# Patient Record
Sex: FEMALE | Race: WHITE | NOT HISPANIC OR LATINO | Employment: OTHER | URBAN - METROPOLITAN AREA
[De-identification: names, ages, dates, MRNs, and addresses within clinical notes are randomized per-mention and may not be internally consistent; named-entity substitution may affect disease eponyms.]

---

## 2017-10-27 ENCOUNTER — ESTABLISHED COMPREHENSIVE EXAM (OUTPATIENT)
Dept: URBAN - METROPOLITAN AREA CLINIC 43 | Facility: CLINIC | Age: 72
End: 2017-10-27

## 2017-10-27 DIAGNOSIS — H43.813: ICD-10-CM

## 2017-10-27 DIAGNOSIS — Z96.1: ICD-10-CM

## 2017-10-27 DIAGNOSIS — H04.123: ICD-10-CM

## 2017-10-27 PROCEDURE — 1036F TOBACCO NON-USER: CPT

## 2017-10-27 PROCEDURE — 92015 DETERMINE REFRACTIVE STATE: CPT

## 2017-10-27 PROCEDURE — 92014 COMPRE OPH EXAM EST PT 1/>: CPT

## 2017-10-27 PROCEDURE — G8427 DOCREV CUR MEDS BY ELIG CLIN: HCPCS

## 2017-10-27 PROCEDURE — G8785 BP SCRN NO PERF AT INTERVAL: HCPCS

## 2017-10-27 ASSESSMENT — TONOMETRY
OS_IOP_MMHG: 8
OD_IOP_MMHG: 8

## 2017-10-27 ASSESSMENT — VISUAL ACUITY
OU_SC: 20/30-2
OS_SC: 20/30+2
OU_SC: J1
OS_SC: J1
OD_SC: J4
OD_SC: 20/50-1

## 2017-11-27 ENCOUNTER — SURGERY/PROCEDURE (OUTPATIENT)
Dept: URBAN - METROPOLITAN AREA CLINIC 39 | Facility: CLINIC | Age: 72
End: 2017-11-27

## 2017-11-27 ENCOUNTER — ESTABLISHED PATIENT (OUTPATIENT)
Dept: URBAN - METROPOLITAN AREA CLINIC 39 | Facility: CLINIC | Age: 72
End: 2017-11-27

## 2017-11-27 VITALS
DIASTOLIC BLOOD PRESSURE: 62 MMHG | RESPIRATION RATE: 17 BRPM | SYSTOLIC BLOOD PRESSURE: 135 MMHG | HEART RATE: 78 BPM | HEIGHT: 55 IN

## 2017-11-27 DIAGNOSIS — H26.491: ICD-10-CM

## 2017-11-27 DIAGNOSIS — H26.492: ICD-10-CM

## 2017-11-27 PROCEDURE — 92014 COMPRE OPH EXAM EST PT 1/>: CPT

## 2017-11-27 PROCEDURE — 1036F TOBACCO NON-USER: CPT

## 2017-11-27 PROCEDURE — G8952 PRE-HTN/HTN, NO F/U, NOT GVN: HCPCS

## 2017-11-27 PROCEDURE — 66821 AFTER CATARACT LASER SURGERY: CPT

## 2017-11-27 PROCEDURE — G8427 DOCREV CUR MEDS BY ELIG CLIN: HCPCS

## 2017-11-27 ASSESSMENT — VISUAL ACUITY
OD_SC: J4
OS_SC: J1
OD_BAT: 20/40
OS_BAT: 20/40
OS_SC: 20/25+2
OD_SC: 20/25

## 2017-11-27 ASSESSMENT — TONOMETRY
OD_IOP_MMHG: 11
OS_IOP_MMHG: 11

## 2017-12-04 ENCOUNTER — ESTABLISHED PATIENT (OUTPATIENT)
Dept: URBAN - METROPOLITAN AREA CLINIC 39 | Facility: CLINIC | Age: 72
End: 2017-12-04

## 2017-12-04 ENCOUNTER — SURGERY/PROCEDURE (OUTPATIENT)
Dept: URBAN - METROPOLITAN AREA SURGERY 14 | Facility: SURGERY | Age: 72
End: 2017-12-04

## 2017-12-04 DIAGNOSIS — H26.492: ICD-10-CM

## 2017-12-04 PROCEDURE — G8428 CUR MEDS NOT DOCUMENT: HCPCS

## 2017-12-04 PROCEDURE — G8482 FLU IMMUNIZE ORDER/ADMIN: HCPCS

## 2017-12-04 PROCEDURE — 1036F TOBACCO NON-USER: CPT

## 2017-12-04 PROCEDURE — G8785 BP SCRN NO PERF AT INTERVAL: HCPCS

## 2017-12-04 PROCEDURE — 66821 AFTER CATARACT LASER SURGERY: CPT

## 2017-12-04 PROCEDURE — 4040F PNEUMOC VAC/ADMIN/RCVD: CPT

## 2017-12-04 PROCEDURE — 99213 OFFICE O/P EST LOW 20 MIN: CPT

## 2017-12-04 ASSESSMENT — VISUAL ACUITY
OS_SC: J1
OD_SC: J1
OD_SC: 20/20
OS_SC: 20/20
OS_BAT: 20/40

## 2017-12-04 ASSESSMENT — TONOMETRY
OD_IOP_MMHG: 12
OS_IOP_MMHG: 12

## 2017-12-05 ENCOUNTER — POST-OP (OUTPATIENT)
Dept: URBAN - METROPOLITAN AREA CLINIC 43 | Facility: CLINIC | Age: 72
End: 2017-12-05

## 2017-12-05 DIAGNOSIS — Z98.890: ICD-10-CM

## 2017-12-05 PROCEDURE — 99024 POSTOP FOLLOW-UP VISIT: CPT

## 2017-12-05 ASSESSMENT — TONOMETRY
OD_IOP_MMHG: 12
OS_IOP_MMHG: 12

## 2017-12-05 ASSESSMENT — VISUAL ACUITY
OS_SC: 20/30-1
OD_SC: 20/25-3
OU_SC: 20/25-3
OU_SC: J1
OS_SC: J1
OD_SC: J2

## 2018-11-05 ENCOUNTER — ESTABLISHED COMPREHENSIVE EXAM (OUTPATIENT)
Dept: URBAN - METROPOLITAN AREA CLINIC 43 | Facility: CLINIC | Age: 73
End: 2018-11-05

## 2018-11-05 DIAGNOSIS — H43.813: ICD-10-CM

## 2018-11-05 DIAGNOSIS — H04.123: ICD-10-CM

## 2018-11-05 DIAGNOSIS — Z96.1: ICD-10-CM

## 2018-11-05 PROCEDURE — 92014 COMPRE OPH EXAM EST PT 1/>: CPT

## 2018-11-05 PROCEDURE — 92015 DETERMINE REFRACTIVE STATE: CPT

## 2018-11-05 PROCEDURE — 1036F TOBACCO NON-USER: CPT

## 2018-11-05 PROCEDURE — G8427 DOCREV CUR MEDS BY ELIG CLIN: HCPCS

## 2018-11-05 PROCEDURE — G9903 PT SCRN TBCO ID AS NON USER: HCPCS

## 2018-11-05 PROCEDURE — G8785 BP SCRN NO PERF AT INTERVAL: HCPCS

## 2018-11-05 ASSESSMENT — TONOMETRY
OS_IOP_MMHG: 10
OD_IOP_MMHG: 11

## 2018-11-05 ASSESSMENT — VISUAL ACUITY
OD_SC: 20/25-3
OD_SC: J2
OS_SC: 20/30+2
OU_SC: J1
OU_SC: 20/30+2
OS_SC: J1

## 2019-11-11 ENCOUNTER — ESTABLISHED COMPREHENSIVE EXAM (OUTPATIENT)
Dept: URBAN - METROPOLITAN AREA CLINIC 43 | Facility: CLINIC | Age: 74
End: 2019-11-11

## 2019-11-11 DIAGNOSIS — H04.123: ICD-10-CM

## 2019-11-11 DIAGNOSIS — H43.813: ICD-10-CM

## 2019-11-11 PROCEDURE — 92015 DETERMINE REFRACTIVE STATE: CPT

## 2019-11-11 PROCEDURE — 92014 COMPRE OPH EXAM EST PT 1/>: CPT

## 2019-11-11 ASSESSMENT — TONOMETRY
OD_IOP_MMHG: 11
OS_IOP_MMHG: 10

## 2019-11-11 ASSESSMENT — VISUAL ACUITY
OS_SC: 20/25-2
OD_SC: 20/25-1
OU_SC: 20/25
OS_SC: J1-
OD_SC: J1-
OU_SC: J1

## 2020-11-10 ENCOUNTER — ESTABLISHED COMPREHENSIVE EXAM (OUTPATIENT)
Dept: URBAN - METROPOLITAN AREA CLINIC 43 | Facility: CLINIC | Age: 75
End: 2020-11-10

## 2020-11-10 DIAGNOSIS — Z01.00: ICD-10-CM

## 2020-11-10 DIAGNOSIS — H04.123: ICD-10-CM

## 2020-11-10 DIAGNOSIS — H43.813: ICD-10-CM

## 2020-11-10 PROCEDURE — 92015 DETERMINE REFRACTIVE STATE: CPT

## 2020-11-10 PROCEDURE — 92014 COMPRE OPH EXAM EST PT 1/>: CPT

## 2020-11-10 ASSESSMENT — VISUAL ACUITY
OD_SC: J1-
OS_SC: 20/30-1
OD_SC: 20/20
OS_SC: J1

## 2020-11-10 ASSESSMENT — TONOMETRY
OD_IOP_MMHG: 14
OS_IOP_MMHG: 14

## 2021-11-08 ENCOUNTER — ESTABLISHED COMPREHENSIVE EXAM (OUTPATIENT)
Dept: URBAN - METROPOLITAN AREA CLINIC 43 | Facility: CLINIC | Age: 76
End: 2021-11-08

## 2021-11-08 DIAGNOSIS — H04.123: ICD-10-CM

## 2021-11-08 DIAGNOSIS — Z01.00: ICD-10-CM

## 2021-11-08 DIAGNOSIS — H43.813: ICD-10-CM

## 2021-11-08 PROCEDURE — 92015 DETERMINE REFRACTIVE STATE: CPT

## 2021-11-08 PROCEDURE — 92014 COMPRE OPH EXAM EST PT 1/>: CPT

## 2021-11-08 ASSESSMENT — VISUAL ACUITY
OS_SC: 20/30+2
OS_SC: J1
OD_SC: 20/30+2
OD_SC: J1

## 2021-11-08 ASSESSMENT — TONOMETRY
OS_IOP_MMHG: 14
OD_IOP_MMHG: 14

## 2022-11-07 ENCOUNTER — COMPREHENSIVE EXAM (OUTPATIENT)
Dept: URBAN - METROPOLITAN AREA CLINIC 43 | Facility: CLINIC | Age: 77
End: 2022-11-07

## 2022-11-07 DIAGNOSIS — Z01.00: ICD-10-CM

## 2022-11-07 DIAGNOSIS — H43.813: ICD-10-CM

## 2022-11-07 DIAGNOSIS — H04.123: ICD-10-CM

## 2022-11-07 PROCEDURE — 92015 DETERMINE REFRACTIVE STATE: CPT

## 2022-11-07 PROCEDURE — 92014 COMPRE OPH EXAM EST PT 1/>: CPT

## 2022-11-07 ASSESSMENT — VISUAL ACUITY
OS_SC: J1
OD_SC: 20/30-2
OS_SC: 20/40+2
OD_SC: J1-

## 2022-11-07 ASSESSMENT — TONOMETRY
OS_IOP_MMHG: 11
OD_IOP_MMHG: 12

## 2023-04-04 NOTE — PATIENT DISCUSSION
Recommended observation. No. SOLA screening performed.  STOP BANG Legend: 0-2 = LOW Risk; 3-4 = INTERMEDIATE Risk; 5-8 = HIGH Risk

## 2023-11-06 ENCOUNTER — COMPREHENSIVE EXAM (OUTPATIENT)
Dept: URBAN - METROPOLITAN AREA CLINIC 43 | Facility: CLINIC | Age: 78
End: 2023-11-06

## 2023-11-06 DIAGNOSIS — H43.813: ICD-10-CM

## 2023-11-06 DIAGNOSIS — Z01.00: ICD-10-CM

## 2023-11-06 DIAGNOSIS — H04.123: ICD-10-CM

## 2023-11-06 PROCEDURE — 92014 COMPRE OPH EXAM EST PT 1/>: CPT

## 2023-11-06 PROCEDURE — 92015 DETERMINE REFRACTIVE STATE: CPT

## 2023-11-06 ASSESSMENT — VISUAL ACUITY
OD_SC: J1
OD_SC: 20/25-2
OS_SC: J1
OU_SC: 20/25+2
OS_SC: 20/30

## 2023-11-06 ASSESSMENT — TONOMETRY
OS_IOP_MMHG: 11
OD_IOP_MMHG: 11

## 2024-10-02 ENCOUNTER — COMPREHENSIVE EXAM (OUTPATIENT)
Dept: URBAN - METROPOLITAN AREA CLINIC 43 | Facility: CLINIC | Age: 79
End: 2024-10-02

## 2024-10-02 DIAGNOSIS — H04.123: ICD-10-CM

## 2024-10-02 DIAGNOSIS — H43.813: ICD-10-CM

## 2024-10-02 DIAGNOSIS — Z01.00: ICD-10-CM

## 2024-10-02 PROCEDURE — 92014 COMPRE OPH EXAM EST PT 1/>: CPT

## 2024-10-02 PROCEDURE — 92015 DETERMINE REFRACTIVE STATE: CPT
